# Patient Record
Sex: FEMALE | Race: BLACK OR AFRICAN AMERICAN | URBAN - METROPOLITAN AREA
[De-identification: names, ages, dates, MRNs, and addresses within clinical notes are randomized per-mention and may not be internally consistent; named-entity substitution may affect disease eponyms.]

---

## 2022-06-21 ENCOUNTER — OFFICE VISIT (OUTPATIENT)
Dept: INTERNAL MEDICINE CLINIC | Age: 22
End: 2022-06-21
Payer: COMMERCIAL

## 2022-06-21 VITALS
RESPIRATION RATE: 18 BRPM | OXYGEN SATURATION: 97 % | SYSTOLIC BLOOD PRESSURE: 118 MMHG | DIASTOLIC BLOOD PRESSURE: 84 MMHG | WEIGHT: 131.8 LBS | BODY MASS INDEX: 21.18 KG/M2 | TEMPERATURE: 97.1 F | HEART RATE: 108 BPM | HEIGHT: 66 IN

## 2022-06-21 DIAGNOSIS — H61.22 IMPACTED CERUMEN OF LEFT EAR: ICD-10-CM

## 2022-06-21 DIAGNOSIS — Z13.6 SCREENING, ISCHEMIC HEART DISEASE: ICD-10-CM

## 2022-06-21 DIAGNOSIS — Z13.31 POSITIVE DEPRESSION SCREENING: ICD-10-CM

## 2022-06-21 DIAGNOSIS — Z11.59 ENCOUNTER FOR HEPATITIS C SCREENING TEST FOR LOW RISK PATIENT: ICD-10-CM

## 2022-06-21 DIAGNOSIS — F31.9 BIPOLAR 1 DISORDER, DEPRESSED (HCC): ICD-10-CM

## 2022-06-21 DIAGNOSIS — Z13.0 SCREENING, ANEMIA, DEFICIENCY, IRON: ICD-10-CM

## 2022-06-21 DIAGNOSIS — Z00.00 ROUTINE GENERAL MEDICAL EXAMINATION AT A HEALTH CARE FACILITY: Primary | ICD-10-CM

## 2022-06-21 DIAGNOSIS — Z76.89 ESTABLISHING CARE WITH NEW DOCTOR, ENCOUNTER FOR: ICD-10-CM

## 2022-06-21 DIAGNOSIS — Z13.1 SCREENING FOR DIABETES MELLITUS: ICD-10-CM

## 2022-06-21 DIAGNOSIS — F41.9 ANXIETY: ICD-10-CM

## 2022-06-21 PROCEDURE — 69209 REMOVE IMPACTED EAR WAX UNI: CPT | Performed by: STUDENT IN AN ORGANIZED HEALTH CARE EDUCATION/TRAINING PROGRAM

## 2022-06-21 PROCEDURE — 99204 OFFICE O/P NEW MOD 45 MIN: CPT | Performed by: STUDENT IN AN ORGANIZED HEALTH CARE EDUCATION/TRAINING PROGRAM

## 2022-06-21 PROCEDURE — 99385 PREV VISIT NEW AGE 18-39: CPT | Performed by: STUDENT IN AN ORGANIZED HEALTH CARE EDUCATION/TRAINING PROGRAM

## 2022-06-21 RX ORDER — HYDROXYZINE 50 MG/1
50 TABLET, FILM COATED ORAL
Qty: 30 TABLET | Refills: 2 | Status: SHIPPED | OUTPATIENT
Start: 2022-06-21 | End: 2022-08-09

## 2022-06-21 RX ORDER — LAMOTRIGINE 25 MG/1
TABLET ORAL
COMMUNITY
Start: 2022-05-21 | End: 2022-08-09 | Stop reason: ALTCHOICE

## 2022-06-21 RX ORDER — HYDROXYZINE 25 MG/1
TABLET, FILM COATED ORAL
COMMUNITY
Start: 2022-05-21 | End: 2022-06-21

## 2022-06-21 NOTE — PROGRESS NOTES
Сергей Davis is a 25 y.o. female (: 2000) presenting to address:    Chief Complaint   Patient presents with   [address] [facility] Patient    Establish Care    Physical       Vitals:    22 1032   BP: 118/84   Pulse: (!) 108   Resp: 18   Temp: 97.1 °F (36.2 °C)   TempSrc: Temporal   SpO2: 97%   Weight: 131 lb 12.8 oz (59.8 kg)   Height: 5' 6.14\" (1.68 m)   PainSc:   0 - No pain       Hearing/Vision:   No exam data present    Learning Assessment:   No flowsheet data found. Depression Screening:     3 most recent PHQ Screens 2022   Little interest or pleasure in doing things More than half the days   Feeling down, depressed, irritable, or hopeless Nearly every day   Total Score PHQ 2 5   Trouble falling or staying asleep, or sleeping too much More than half the days   Feeling tired or having little energy Several days   Poor appetite, weight loss, or overeating Nearly every day   Feeling bad about yourself - or that you are a failure or have let yourself or your family down More than half the days   Trouble concentrating on things such as school, work, reading, or watching TV Several days   Moving or speaking so slowly that other people could have noticed; or the opposite being so fidgety that others notice Not at all   Thoughts of being better off dead, or hurting yourself in some way Several days   PHQ 9 Score 15   How difficult have these problems made it for you to do your work, take care of your home and get along with others Somewhat difficult     Fall Risk Assessment:   No flowsheet data found. Abuse Screening:   No flowsheet data found. Coordination of Care Questionaire:     Advanced Directive:   1. Do you have an Advanced Directive? NO    2. Would you like information on Advanced Directives? NO    1. \"Have you been to the ER, urgent care clinic since your last visit? Hospitalized since your last visit? \" No    2.  \"Have you seen or consulted any other health care providers outside of the [facility] [address] [facility] since your last visit? \" No     3. For patients aged 39-70: Has the patient had a colonoscopy? NA - based on age     If the patient is female:    4. For patients aged 41-77: Has the patient had a mammogram within the past 2 years? NA - based on age    11. For patients aged 21-65: Has the patient had a pap smear?  No

## 2022-06-21 NOTE — PROGRESS NOTES
HISTORY OF PRESENT ILLNESS  Judith Larson is a 25 y.o. female. New pt here to [facility]          Review of Systems   HENT: Negative for hearing loss. Eyes: Negative for blurred vision. Respiratory: Negative for shortness of breath. Cardiovascular: Negative for chest pain and palpitations. Gastrointestinal: Negative for abdominal pain, blood in stool, constipation, nausea and vomiting. Genitourinary: Negative for hematuria. Neurological: Negative for dizziness and headaches. Psychiatric/Behavioral: Positive for depression and suicidal ideas. Negative for hallucinations. The patient is nervous/anxious and has insomnia. Physical Exam  Vitals reviewed. Constitutional:       Appearance: Normal appearance. HENT:      Head: Normocephalic and atraumatic. Right Ear: Tympanic membrane normal.      Left Ear: Tympanic membrane normal.      Mouth/Throat:      Comments: MASK  Eyes:      Conjunctiva/sclera: Conjunctivae normal.      Pupils: Pupils are equal, round, and reactive to light. Cardiovascular:      Rate and Rhythm: Normal rate and regular rhythm. Pulmonary:      Effort: Pulmonary effort is normal.      Breath sounds: Normal breath sounds. Abdominal:      General: Bowel sounds are normal.   Musculoskeletal:         General: Normal range of motion. Cervical back: Normal range of motion. Skin:     General: Skin is warm. Psychiatric:         Mood and Affect: Mood normal.         ASSESSMENT and PLAN    ICD-10-CM ICD-9-CM    1. Routine general medical examination at a health care facility  Z00.00 V70.0    2. Screening, ischemic heart disease  H54.3 A01.7 METABOLIC PANEL, COMPREHENSIVE      LIPID PANEL   3. Screening for diabetes mellitus  Z13.1 V77.1 HEMOGLOBIN A1C WITH EAG   4. Encounter for hepatitis C screening test for low risk patient  Z11.59 V73.89 HEPATITIS C AB   5. Screening, anemia, deficiency, iron  Z13.0 V78.0 CBC WITH AUTOMATED DIFF   6.  Positive depression screening  Z13.31 796.4    7. [facility]  F31.9 296.50    8.  Anxiety  F41.9 300.00 hydrOXYzine HCL (ATARAX) 50 mg tablet

## 2022-06-21 NOTE — PROGRESS NOTES
HISTORY OF PRESENT ILLNESS  Slade Rhodes is a 25 y.o. female. New pt here to [facility] and [facility]    PMHx: None  FMHx: Unsure  GYN: 6/14/22-Irregular since menstruation. Some dysmenorrhea. Normal flow. No OCPs. Not currently sexually active. No PAP yet. Denies tobacco smoke or ETOH, smokes marijuana a few times a week working on cutting back. Presents today with concerns for her mood and sleeping- Dx with Bipolar in Jan after experiencing a psychotic break was then placed on hydroxyzine and Zyprexa. Started seeing an online therapy service 2 mo ago had two sessions, at that time Zyprexa was changed to Lamictal 25mg to help with sleep. She didn't like the services and decided to switch to a psychiatrist at [facility] which she starts next week. Doesn't feel like Lamictal is helping with sleep but has noticed some improvement in manic episodes. Insomnia- Has issues with falling asleep. Tosses and turns a lot. Has tried melatonin in the past which is hit or miss for her. Review of Systems   HENT: Negative for hearing loss. Eyes: Negative for blurred vision. Respiratory: Negative for shortness of breath. Cardiovascular: Negative for chest pain and palpitations. Gastrointestinal: Negative for abdominal pain, blood in stool, nausea and vomiting. Neurological: Negative for dizziness and headaches. Psychiatric/Behavioral: Positive for depression. Negative for hallucinations. The patient is nervous/anxious and has insomnia. Physical Exam  Vitals reviewed. Constitutional:       Appearance: Normal appearance. HENT:      Head: Normocephalic and atraumatic. Right Ear: Tympanic membrane normal.      Left Ear: Tympanic membrane normal.      Mouth/Throat:      Comments: MASK  Eyes:      Conjunctiva/sclera: Conjunctivae normal.      Pupils: Pupils are equal, round, and reactive to light. Cardiovascular:      Rate and Rhythm: Normal rate and regular rhythm.    Pulmonary: Breath sounds: Normal breath sounds. Abdominal:      General: Bowel sounds are normal.   Musculoskeletal:         General: Normal range of motion. Cervical back: Normal range of motion. Skin:     General: Skin is warm. Psychiatric:         Mood and Affect: Mood normal.         ASSESSMENT and PLAN    ICD-10-CM ICD-9-CM    1. Routine general medical examination at a health care facility  Z00.00 V70.0    2. Anxiety  F41.9 300.00 hydrOXYzine HCL (ATARAX) 50 mg tablet   3. Bipolar 1 disorder, depressed ([facility]  F31.9 296.50    4. Impacted cerumen of left ear  H61.22 380.4    5. Screening, ischemic heart disease  B65.1 Z41.3 METABOLIC PANEL, COMPREHENSIVE      LIPID PANEL      LIPID PANEL      METABOLIC PANEL, COMPREHENSIVE   6. Screening for diabetes mellitus  Z13.1 V77.1 HEMOGLOBIN A1C WITH EAG      HEMOGLOBIN A1C WITH EAG   7. Encounter for hepatitis C screening test for low risk patient  Z11.59 V73.89 HEPATITIS C AB      HEPATITIS C AB   8. Screening, anemia, deficiency, iron  Z13.0 V78.0 CBC WITH AUTOMATED DIFF      CBC WITH AUTOMATED DIFF   9. Positive depression screening  Z13.31 796.4    10. Establishing care with new doctor, encounter for  Z76.89 V65.8     Doing well overall. Counseled on implementing healthy lifestyle routines. Cutting back on refined sugars and grains and substituting for whole grains, eating fresh fruits and vegetables, nuts and lean protein. Important to get 150 min of exercise a week. Screening for ischemic heart dz and diabetes today  Discussed the following vaccinations based on CDC recommendations: TDAP, COVID, Influenza  Discussed recommended routine screenings for PAP smear  RTO in 1 year for [facility]PE  Discussed benefits of having nonpharmacologic and pharmacologic treatment to manage long standing anxiety.   Pt understands that the best way to treat is with a matainence medication in addition to therapy and is open to est care with a therapist. Brook Vázquez continue with psych assessment with [facility]. Will increase Atarax to 50mg TID, instructed to start by taking once QAM and once QHS to see if also helps with sleep, discussed SE of medciation. Continue Lamictal unless changed by psych. On the basis of positive PHQ-9 screening (PHQ 9 Score: 15), additional evaluation and assessment performed, follow-up plan includes but not limited to: Medication management and Referral to /Specialist for evaluation and management and additional evaluation and assessment performed. Patient will follow-up in 4 weeks. Attempted to flush ear today. Pt tolerated procedure well. Given ear drops to apply daily. Will need to RTO finish. Advised to avoid using Q-Tips or sticking objects in ear. Follow-up and Dispositions    · Return in about 4 weeks (around 7/19/2022) for cerumen impaction.

## 2022-06-22 LAB
ALBUMIN SERPL-MCNC: 5.3 G/DL (ref 3.9–5)
ALBUMIN/GLOB SERPL: 1.8 {RATIO} (ref 1.2–2.2)
ALP SERPL-CCNC: 67 IU/L (ref 44–121)
ALT SERPL-CCNC: 10 IU/L (ref 0–32)
AST SERPL-CCNC: 13 IU/L (ref 0–40)
BASOPHILS # BLD AUTO: 0 X10E3/UL (ref 0–0.2)
BASOPHILS NFR BLD AUTO: 0 %
BILIRUB SERPL-MCNC: 0.3 MG/DL (ref 0–1.2)
BUN SERPL-MCNC: 14 MG/DL (ref 6–20)
BUN/CREAT SERPL: 23 (ref 9–23)
CALCIUM SERPL-MCNC: 9.8 MG/DL (ref 8.7–10.2)
CHLORIDE SERPL-SCNC: 99 MMOL/L (ref 96–106)
CHOLEST SERPL-MCNC: 193 MG/DL (ref 100–199)
CO2 SERPL-SCNC: 23 MMOL/L (ref 20–29)
CREAT SERPL-MCNC: 0.6 MG/DL (ref 0.57–1)
EGFR: 131 ML/MIN/1.73
EOSINOPHIL # BLD AUTO: 0.1 X10E3/UL (ref 0–0.4)
EOSINOPHIL NFR BLD AUTO: 1 %
ERYTHROCYTE [DISTWIDTH] IN BLOOD BY AUTOMATED COUNT: 12.2 % (ref 11.7–15.4)
EST. AVERAGE GLUCOSE BLD GHB EST-MCNC: 100 MG/DL
GLOBULIN SER CALC-MCNC: 2.9 G/DL (ref 1.5–4.5)
GLUCOSE SERPL-MCNC: 76 MG/DL (ref 65–99)
HBA1C MFR BLD: 5.1 % (ref 4.8–5.6)
HCT VFR BLD AUTO: 43.8 % (ref 34–46.6)
HCV AB S/CO SERPL IA: <0.1 S/CO RATIO (ref 0–0.9)
HDLC SERPL-MCNC: 83 MG/DL
HGB BLD-MCNC: 14.5 G/DL (ref 11.1–15.9)
IMM GRANULOCYTES # BLD AUTO: 0 X10E3/UL (ref 0–0.1)
IMM GRANULOCYTES NFR BLD AUTO: 0 %
IMP & REVIEW OF LAB RESULTS: NORMAL
LDLC SERPL CALC-MCNC: 101 MG/DL (ref 0–99)
LYMPHOCYTES # BLD AUTO: 1.9 X10E3/UL (ref 0.7–3.1)
LYMPHOCYTES NFR BLD AUTO: 24 %
MCH RBC QN AUTO: 29.5 PG (ref 26.6–33)
MCHC RBC AUTO-ENTMCNC: 33.1 G/DL (ref 31.5–35.7)
MCV RBC AUTO: 89 FL (ref 79–97)
MONOCYTES # BLD AUTO: 0.4 X10E3/UL (ref 0.1–0.9)
MONOCYTES NFR BLD AUTO: 4 %
NEUTROPHILS # BLD AUTO: 5.8 X10E3/UL (ref 1.4–7)
NEUTROPHILS NFR BLD AUTO: 71 %
PLATELET # BLD AUTO: 320 X10E3/UL (ref 150–450)
POTASSIUM SERPL-SCNC: 4.4 MMOL/L (ref 3.5–5.2)
PROT SERPL-MCNC: 8.2 G/DL (ref 6–8.5)
RBC # BLD AUTO: 4.92 X10E6/UL (ref 3.77–5.28)
SODIUM SERPL-SCNC: 139 MMOL/L (ref 134–144)
TRIGL SERPL-MCNC: 45 MG/DL (ref 0–149)
VLDLC SERPL CALC-MCNC: 9 MG/DL (ref 5–40)
WBC # BLD AUTO: 8.2 X10E3/UL (ref 3.4–10.8)

## 2022-07-19 ENCOUNTER — OFFICE VISIT (OUTPATIENT)
Dept: INTERNAL MEDICINE CLINIC | Age: 22
End: 2022-07-19
Payer: COMMERCIAL

## 2022-07-19 VITALS
HEIGHT: 66 IN | BODY MASS INDEX: 21.05 KG/M2 | SYSTOLIC BLOOD PRESSURE: 135 MMHG | WEIGHT: 131 LBS | OXYGEN SATURATION: 98 % | TEMPERATURE: 97.2 F | HEART RATE: 97 BPM | DIASTOLIC BLOOD PRESSURE: 83 MMHG | RESPIRATION RATE: 20 BRPM

## 2022-07-19 DIAGNOSIS — Z13.31 POSITIVE DEPRESSION SCREENING: ICD-10-CM

## 2022-07-19 DIAGNOSIS — H61.22 IMPACTED CERUMEN OF LEFT EAR: Primary | ICD-10-CM

## 2022-07-19 DIAGNOSIS — F31.9 BIPOLAR 1 DISORDER, DEPRESSED (HCC): ICD-10-CM

## 2022-07-19 PROCEDURE — 99213 OFFICE O/P EST LOW 20 MIN: CPT | Performed by: STUDENT IN AN ORGANIZED HEALTH CARE EDUCATION/TRAINING PROGRAM

## 2022-07-19 PROCEDURE — 69209 REMOVE IMPACTED EAR WAX UNI: CPT | Performed by: STUDENT IN AN ORGANIZED HEALTH CARE EDUCATION/TRAINING PROGRAM

## 2022-07-19 RX ORDER — QUETIAPINE FUMARATE 50 MG/1
TABLET, FILM COATED ORAL
COMMUNITY
Start: 2022-06-28 | End: 2022-08-09 | Stop reason: SDUPTHER

## 2022-07-19 NOTE — PROGRESS NOTES
Graham Davison is a 25 y.o. female (: 2000) presenting to address:    Chief Complaint   Patient presents with    Wax in Ear       Vitals:    22 1334   BP: 135/83   Pulse: 97   Resp: 20   Temp: 97.2 °F (36.2 °C)   TempSrc: Temporal   SpO2: 98%   Weight: 131 lb (59.4 kg)   Height: 5' 6.14\" (1.68 m)   PainSc:   0 - No pain   LMP: 2022       Hearing/Vision:   No exam data present    Learning Assessment:   No flowsheet data found. Depression Screening:     3 most recent PHQ Screens 2022   Little interest or pleasure in doing things More than half the days   Feeling down, depressed, irritable, or hopeless Several days   Total Score PHQ 2 3   Trouble falling or staying asleep, or sleeping too much Several days   Feeling tired or having little energy More than half the days   Poor appetite, weight loss, or overeating Nearly every day   Feeling bad about yourself - or that you are a failure or have let yourself or your family down More than half the days   Trouble concentrating on things such as school, work, reading, or watching TV Several days   Moving or speaking so slowly that other people could have noticed; or the opposite being so fidgety that others notice Several days   Thoughts of being better off dead, or hurting yourself in some way Not at all   PHQ 9 Score 13   How difficult have these problems made it for you to do your work, take care of your home and get along with others Very difficult     Fall Risk Assessment:   No flowsheet data found. Abuse Screening:   No flowsheet data found. Coordination of Care Questionaire:     Advanced Directive:   1. Do you have an Advanced Directive? NO    2. Would you like information on Advanced Directives? NO    1. \"Have you been to the ER, urgent care clinic since your last visit? Hospitalized since your last visit? \" No    2.  \"Have you seen or consulted any other health care providers outside of the [address] [place] [address] since your last visit? \" No     3. For patients aged 39-70: Has the patient had a colonoscopy? No     If the patient is female:    4. For patients aged 41-77: Has the patient had a mammogram within the past 2 years? No    5. For patients aged 21-65: Has the patient had a pap smear?  No

## 2022-07-19 NOTE — PROGRESS NOTES
HISTORY OF PRESENT ILLNESS  Marisel Herbert is a 25 y.o. female. Mood d/o- Had assessment with new psych 7/12/22 and f/u on 7/18/22. Lamictal d/c and started on Seroquel, Seroquel was then changed to Zyprexa and Fluoxitine to mimic Latuda as pts insurance doesn't cover. Hasnt stared the new combination yet. Right now following every 2 weeks. Denies thoughts of harming self or others. Does still have thoughts of ideation with no plan. Is planning to move and is hoping will be a fresh start to her current situation. Cerumen impaction- Applied the drops twice since last visit. Denies ear pain. Review of Systems   HENT: Negative for ear pain. Psychiatric/Behavioral: Positive for depression and suicidal ideas. Negative for hallucinations. /83 (BP 1 Location: Right upper arm, BP Patient Position: Sitting, BP Cuff Size: Adult)   Pulse 97   Temp 97.2 °F (36.2 °C) (Temporal)   Resp 20   Ht 5' 6.14\" (1.68 m)   Wt 131 lb (59.4 kg)   LMP 06/14/2022   SpO2 98%   BMI 21.05 kg/m²     Physical Exam  HENT:      Right Ear: Tympanic membrane normal.      Left Ear: There is impacted cerumen. ASSESSMENT and PLAN    ICD-10-CM ICD-9-CM    1. Impacted cerumen of left ear  H61.22 380.4 REFERRAL TO ENT-OTOLARYNGOLOGY   2. Bipolar 1 disorder, depressed (HCC)  F31.9 296.50    3. Positive depression screening  Z13.31 796.4    On the basis of positive PHQ-9 screening (PHQ 9 Score: 13), additional evaluation and assessment performed, follow-up plan includes but not limited to: Medication management and Referral to /Specialist for evaluation and management and patient instructed to schedule a follow-up visit at this practice. Patient will follow-up in 1 month. C/w psych for med management  Attempted to flush ear today. Pt tolerated procedure well, some ear wax removed. Still with pressure in ear. Given ear drops to continue to apply daily. Referral given for chronic impaction.  Advised to avoid using Q-Tips or sticking objects in ear. Follow-up and Dispositions    · Return in about 1 month (around 8/19/2022).

## 2022-08-09 ENCOUNTER — OFFICE VISIT (OUTPATIENT)
Dept: INTERNAL MEDICINE CLINIC | Age: 22
End: 2022-08-09
Payer: COMMERCIAL

## 2022-08-09 VITALS
HEART RATE: 92 BPM | HEIGHT: 66 IN | RESPIRATION RATE: 20 BRPM | BODY MASS INDEX: 22.34 KG/M2 | OXYGEN SATURATION: 99 % | SYSTOLIC BLOOD PRESSURE: 116 MMHG | TEMPERATURE: 97.1 F | WEIGHT: 139 LBS | DIASTOLIC BLOOD PRESSURE: 82 MMHG

## 2022-08-09 DIAGNOSIS — H61.22 IMPACTED CERUMEN OF LEFT EAR: ICD-10-CM

## 2022-08-09 DIAGNOSIS — F31.9 BIPOLAR 1 DISORDER, DEPRESSED (HCC): Primary | ICD-10-CM

## 2022-08-09 PROCEDURE — 99214 OFFICE O/P EST MOD 30 MIN: CPT | Performed by: STUDENT IN AN ORGANIZED HEALTH CARE EDUCATION/TRAINING PROGRAM

## 2022-08-09 RX ORDER — QUETIAPINE FUMARATE 50 MG/1
50 TABLET, FILM COATED ORAL
Qty: 90 TABLET | Refills: 0 | Status: SHIPPED | OUTPATIENT
Start: 2022-08-09

## 2022-08-09 NOTE — PROGRESS NOTES
Jesus Torres is a 25 y.o. female (: 2000) presenting to address:    Chief Complaint   Patient presents with    Anxiety    Depression       Vitals:    22 0947   BP: 116/82   Pulse: 92   Resp: 20   Temp: 97.1 °F (36.2 °C)   TempSrc: Temporal   SpO2: 99%   Weight: 139 lb (63 kg)   Height: 5' 6.14\" (1.68 m)   PainSc:   0 - No pain   LMP: 2022       Hearing/Vision:   No results found. Learning Assessment:   No flowsheet data found. Depression Screening:     3 most recent PHQ Screens 2022   Little interest or pleasure in doing things Several days   Feeling down, depressed, irritable, or hopeless Several days   Total Score PHQ 2 2   Trouble falling or staying asleep, or sleeping too much -   Feeling tired or having little energy -   Poor appetite, weight loss, or overeating -   Feeling bad about yourself - or that you are a failure or have let yourself or your family down -   Trouble concentrating on things such as school, work, reading, or watching TV -   Moving or speaking so slowly that other people could have noticed; or the opposite being so fidgety that others notice -   Thoughts of being better off dead, or hurting yourself in some way -   PHQ 9 Score -   How difficult have these problems made it for you to do your work, take care of your home and get along with others -     Fall Risk Assessment:   No flowsheet data found. Abuse Screening:   No flowsheet data found. Coordination of Care Questionaire:     Advanced Directive:   1. Do you have an Advanced Directive? NO    2. Would you like information on Advanced Directives? NO    1. \"Have you been to the ER, urgent care clinic since your last visit? Hospitalized since your last visit? \" No    2. \"Have you seen or consulted any other health care providers outside of the [address] [place] [address] since your last visit? \" No     3. For patients aged 39-70: Has the patient had a colonoscopy?  NA - based on age     If the patient is female:    4. For patients aged 41-77: Has the patient had a mammogram within the past 2 years? NA - based on age    11. For patients aged 21-65: Has the patient had a pap smear?  No

## 2022-08-09 NOTE — PROGRESS NOTES
HISTORY OF PRESENT ILLNESS  Angy Moyer is a 25 y.o. female. Here to fu on mood d/o    Bipolar depression- Stopped seeing therapy 2 weeks ago as she is moving and was unsure of how she would maintain fu. Taking Seroquel 50mg however it is making her tired. Didn't start Zyprexa as she is fearful of weight gain and dint feel like Lamictal was effective. Has been on 7 different medications since Jan and she is concerned that none of them are helping. Denies any manic episodes. She is planning on reestablishing when she gets up to [place].         Review of Systems   Respiratory:  Negative for shortness of breath. Cardiovascular:  Negative for chest pain and palpitations. Neurological:  Negative for dizziness and headaches. Psychiatric/Behavioral:  Negative for depression and suicidal ideas. /82 (BP 1 Location: Right upper arm, BP Patient Position: Sitting, BP Cuff Size: Adult)   Pulse 92   Temp 97.1 °F (36.2 °C) (Temporal)   Resp 20   Ht 5' 6.14\" (1.68 m)   Wt 139 lb (63 kg)   LMP 06/21/2022   SpO2 99%   BMI 22.34 kg/m²     Physical Exam  Vitals reviewed. Constitutional:       Appearance: Normal appearance. HENT:      Mouth/Throat:      Comments: MASK  Eyes:      Conjunctiva/sclera: Conjunctivae normal.      Pupils: Pupils are equal, round, and reactive to light. Cardiovascular:      Rate and Rhythm: Normal rate and regular rhythm. Pulses: Normal pulses. Heart sounds: Normal heart sounds. Pulmonary:      Effort: Pulmonary effort is normal.      Breath sounds: Normal breath sounds. Abdominal:      General: Abdomen is flat. Psychiatric:         Mood and Affect: Mood normal.       ASSESSMENT and PLAN    ICD-10-CM ICD-9-CM    1. Bipolar 1 disorder, depressed (HCC)  F31.9 296.50 QUEtiapine (SEROquel) 50 mg tablet      2. Impacted cerumen of left ear  H61.22 380.4       Bipolar not completely stable. Continue Seroquel 50mg. Provided with #90 as pt is transitioning providers. Encouraged to continue to see psych once established